# Patient Record
Sex: MALE | Race: WHITE | NOT HISPANIC OR LATINO | Employment: OTHER | URBAN - NONMETROPOLITAN AREA
[De-identification: names, ages, dates, MRNs, and addresses within clinical notes are randomized per-mention and may not be internally consistent; named-entity substitution may affect disease eponyms.]

---

## 2022-10-21 ENCOUNTER — HOSPITAL ENCOUNTER (EMERGENCY)
Facility: HOSPITAL | Age: 47
Discharge: HOME | End: 2022-10-21
Attending: EMERGENCY MEDICINE | Admitting: EMERGENCY MEDICINE

## 2022-10-21 VITALS
TEMPERATURE: 98 F | WEIGHT: 235 LBS | RESPIRATION RATE: 16 BRPM | SYSTOLIC BLOOD PRESSURE: 139 MMHG | HEIGHT: 70 IN | DIASTOLIC BLOOD PRESSURE: 102 MMHG | BODY MASS INDEX: 33.64 KG/M2 | HEART RATE: 76 BPM | OXYGEN SATURATION: 97 %

## 2022-10-21 DIAGNOSIS — S01.511A LIP LACERATION, INITIAL ENCOUNTER: Primary | ICD-10-CM

## 2022-10-21 PROCEDURE — 90471 IMMUNIZATION ADMIN: CPT | Performed by: PHYSICIAN ASSISTANT

## 2022-10-21 PROCEDURE — 90715 TDAP VACCINE 7 YRS/> IM: CPT | Performed by: PHYSICIAN ASSISTANT

## 2022-10-21 PROCEDURE — 25010000002 TETANUS-DIPHTH-ACELL PERTUSSIS 5-2.5-18.5 LF-MCG/0.5 SUSPENSION PREFILLED SYRINGE: Performed by: PHYSICIAN ASSISTANT

## 2022-10-21 PROCEDURE — 99282 EMERGENCY DEPT VISIT SF MDM: CPT

## 2022-10-21 RX ORDER — LIDOCAINE AND PRILOCAINE 25; 25 MG/G; MG/G
1 CREAM TOPICAL ONCE
Status: DISCONTINUED | OUTPATIENT
Start: 2022-10-21 | End: 2022-10-21 | Stop reason: HOSPADM

## 2022-10-21 RX ORDER — LIDOCAINE HYDROCHLORIDE 10 MG/ML
10 INJECTION, SOLUTION INFILTRATION; PERINEURAL ONCE
Status: DISCONTINUED | OUTPATIENT
Start: 2022-10-21 | End: 2022-10-21 | Stop reason: HOSPADM

## 2022-10-21 RX ADMIN — TETANUS TOXOID, REDUCED DIPHTHERIA TOXOID AND ACELLULAR PERTUSSIS VACCINE, ADSORBED 0.5 ML: 5; 2.5; 8; 8; 2.5 SUSPENSION INTRAMUSCULAR at 16:11

## 2022-10-21 NOTE — DISCHARGE INSTRUCTIONS
Sutures will dissolve on their own. Try to follow up with your PCP if needed.  It may bruise and swell, can apply ice for 10 to 15-minute increments 3-4 times per day.  Gently keep clean with mild soap and water. May need to follow up with plastic surgery if there is any scar tissue or abnormalities. Return to ER for any change, worsening of symptoms, or and additional concerns including but not limited to severe redness or swelling, purulent drainage, fever greater than 100.4.

## 2022-10-21 NOTE — ED PROVIDER NOTES
"Subjective   History of Present Illness  Patient is a 46-year-old male with no reported past medical history presenting to the ER for evaluation of lip laceration.  Patient states he slipped getting out of the shower at a local truck stop and ended up striking his face on the floor.  He is a  from [place].  He sustained a laceration to his lower lip.  He denies any LOC.  He denies any dizziness shortness of breath or chest pain prior to the fall. He states he went to urgent treatment center and they told him to come here to the ER.  He denies any headache, jaw pain.  He denies any injury to the teeth.  He is not on blood thinner medication.  He is unsure whether he had his last tetanus.  He denies any vision loss or changes, neck pain or stiffness, headache, extremity weakness, or any other symptoms.        Review of Systems   Constitutional: Negative for chills and fever.   HENT: Negative.    Eyes: Negative.    Respiratory: Negative.    Cardiovascular: Negative.    Gastrointestinal: Negative.    Genitourinary: Negative.    Musculoskeletal: Negative.    Skin: Positive for wound.   Allergic/Immunologic: Negative for immunocompromised state.   Neurological: Negative.    Psychiatric/Behavioral: Negative.        History reviewed. No pertinent past medical history.    No Known Allergies    History reviewed. No pertinent surgical history.    History reviewed. No pertinent family history.    Social History     Socioeconomic History   • Marital status:            Objective   Physical Exam  Vitals and nursing note reviewed.     BP (!) 139/102 (BP Location: Left arm, Patient Position: Sitting)   Pulse 76   Temp 98 °F (36.7 °C)   Resp 16   Ht 177.8 cm (70\")   Wt 107 kg (235 lb)   SpO2 97%   BMI 33.72 kg/m²     GEN: No acute distress, sitting upright in stretcher.  Awake and alert.  Does not appear septic or toxic.  Skin: Patient has a laceration on his bottom lip that is t-shape, approximately 1.5 " cm in length.  It does not cross the vermilion border.  There is a contusion on the top upper lip as well.  Head: Normocephalic, atraumatic, no tenderness over the facial bones or skull  Neck: No midline tenderness  Eyes: EOM intact  ENT: Posterior pharynx normal in appearance, oral mucosa is moist, tongue midline, no trismus, teeth are well aligned.  Patient has a laceration to the bottom lower lip as described above.  It does not go all the way through the lip.  Chest: Nontender to palpation  Cardiovascular: Regular rate and rhythm  Lungs: Clear to auscultation bilaterally without adventitious sounds  Abdomen: Soft, nontender, nondistended, no peritoneal signs, no guarding  Extremities: No edema, normal appearance, full range of motion  Neuro: GCS 15  Psych: Mood and affect are appropriate    Laceration Repair    Date/Time: 10/21/2022 4:27 PM  Performed by: Shahida Varela PA-C  Authorized by: Jw Celaya DO     Consent:     Consent obtained:  Verbal    Consent given by:  Patient    Risks discussed:  Pain, poor cosmetic result, poor wound healing, infection and need for additional repair  Universal protocol:     Patient identity confirmed:  Verbally with patient  Anesthesia:     Anesthesia method:  Local infiltration    Local anesthetic:  Lidocaine 1% w/o epi  Laceration details:     Location:  Lip    Lip location:  Lower exterior lip    Length (cm):  2    Depth (mm):  2  Pre-procedure details:     Preparation:  Patient was prepped and draped in usual sterile fashion  Exploration:     Hemostasis achieved with:  Direct pressure    Imaging outcome: foreign body not noted      Wound extent: no foreign bodies/material noted      Contaminated: no    Treatment:     Area cleansed with:  Saline and chlorhexidine    Amount of cleaning:  Standard    Irrigation solution:  Sterile saline    Irrigation volume:  40 cc    Irrigation method:  Pressure wash    Visualized foreign bodies/material removed: no    Skin repair:      Repair method:  Sutures    Suture size:  5-0    Suture material:  Chromic gut    Suture technique:  Simple interrupted    Number of sutures:  6  Approximation:     Approximation:  Close    Vermilion border well-aligned: yes    Repair type:     Repair type:  Simple  Post-procedure details:     Dressing:  Open (no dressing)    Procedure completion:  Tolerated               ED Course                                           MDM  Number of Diagnoses or Management Options  Lip laceration, initial encounter  Diagnosis management comments: On arrival, patient is stable.  Differential could include laceration, contusion, and other concerns.  Patient denies any headache, LOC or any other symptoms.  Teeth are well aligned.  Will update tetanus, thoroughly clean and irrigate the lip.  He will likely need sutures to bring this together.    Wound was thoroughly cleaned and irrigated per procedure note.  We will have him follow-up with his primary care provider, if he has significant scarring or other abnormalities, he need to see plastic surgery once the wound is healed.  He lives in [place] and is a .  He can follow-up back home.  We will still give patient connection as needed.  Discussed very strict return precautions.  He verbalized under standing and was in agreement with this plan of care.       Amount and/or Complexity of Data Reviewed  Review and summarize past medical records: yes  Discuss the patient with other providers: yes    Risk of Complications, Morbidity, and/or Mortality  Presenting problems: low  Diagnostic procedures: low  Management options: low    Patient Progress  Patient progress: improved      Final diagnoses:   Lip laceration, initial encounter       ED Disposition  ED Disposition     ED Disposition   Discharge    Condition   Stable    Comment   --             PATIENT CONNECTION - [facility] [id number]  [phone]  Schedule an appointment as soon as possible for a visit             Medication List      No changes were made to your prescriptions during this visit.          Shahida Varela PA-C  10/21/22 3099